# Patient Record
Sex: FEMALE | Race: WHITE | Employment: FULL TIME | URBAN - METROPOLITAN AREA
[De-identification: names, ages, dates, MRNs, and addresses within clinical notes are randomized per-mention and may not be internally consistent; named-entity substitution may affect disease eponyms.]

---

## 2018-05-08 LAB
ALBUMIN SERPL-MCNC: 4.9 G/DL
ALP BLD-CCNC: 51 U/L
ALT SERPL-CCNC: 21 U/L
ANION GAP SERPL CALCULATED.3IONS-SCNC: 14 MMOL/L
AST SERPL-CCNC: 21 U/L
AVERAGE GLUCOSE: 134
BILIRUB SERPL-MCNC: 0.3 MG/DL (ref 0.1–1.4)
BUN BLDV-MCNC: 25 MG/DL
CALCIUM SERPL-MCNC: 9.9 MG/DL
CHLORIDE BLD-SCNC: 101 MMOL/L
CHOLESTEROL, TOTAL: 180 MG/DL
CHOLESTEROL/HDL RATIO: 4.1
CO2: 26 MMOL/L
CREAT SERPL-MCNC: 0.9 MG/DL
CREATININE, URINE: NORMAL
GFR CALCULATED: NORMAL
GLUCOSE BLD-MCNC: 107 MG/DL
HBA1C MFR BLD: 6.3 %
HDLC SERPL-MCNC: 44 MG/DL (ref 35–70)
LDL CHOLESTEROL CALCULATED: 99 MG/DL (ref 0–160)
MICROALBUMIN/CREAT 24H UR: NORMAL MG/G{CREAT}
MICROALBUMIN/CREAT UR-RTO: NORMAL
POTASSIUM SERPL-SCNC: 4.5 MMOL/L
SODIUM BLD-SCNC: 141 MMOL/L
TOTAL PROTEIN: 7.9
TRIGL SERPL-MCNC: 185 MG/DL
VLDLC SERPL CALC-MCNC: 37 MG/DL

## 2018-07-17 PROBLEM — I10 ESSENTIAL HYPERTENSION: Status: ACTIVE | Noted: 2018-07-17

## 2018-07-17 PROBLEM — E11.9 TYPE 2 DIABETES MELLITUS WITHOUT COMPLICATION, WITHOUT LONG-TERM CURRENT USE OF INSULIN (HCC): Status: ACTIVE | Noted: 2018-07-17

## 2018-07-25 ENCOUNTER — OFFICE VISIT (OUTPATIENT)
Dept: NON INVASIVE DIAGNOSTICS | Age: 58
End: 2018-07-25
Payer: COMMERCIAL

## 2018-07-25 VITALS
RESPIRATION RATE: 16 BRPM | HEART RATE: 79 BPM | WEIGHT: 150 LBS | SYSTOLIC BLOOD PRESSURE: 124 MMHG | DIASTOLIC BLOOD PRESSURE: 70 MMHG | BODY MASS INDEX: 23.54 KG/M2 | HEIGHT: 67 IN

## 2018-07-25 DIAGNOSIS — I10 ESSENTIAL HYPERTENSION: ICD-10-CM

## 2018-07-25 DIAGNOSIS — R00.2 HEART PALPITATIONS: Primary | ICD-10-CM

## 2018-07-25 DIAGNOSIS — E11.9 TYPE 2 DIABETES MELLITUS WITHOUT COMPLICATION, WITHOUT LONG-TERM CURRENT USE OF INSULIN (HCC): ICD-10-CM

## 2018-07-25 PROCEDURE — 99212 OFFICE O/P EST SF 10 MIN: CPT | Performed by: INTERNAL MEDICINE

## 2018-07-25 PROCEDURE — 93000 ELECTROCARDIOGRAM COMPLETE: CPT | Performed by: INTERNAL MEDICINE

## 2018-07-25 RX ORDER — CHOLECALCIFEROL (VITAMIN D3) 25 MCG
CAPSULE ORAL DAILY
COMMUNITY
End: 2019-05-06

## 2018-09-26 ENCOUNTER — HOSPITAL ENCOUNTER (OUTPATIENT)
Age: 58
Discharge: HOME | End: 2018-09-28

## 2018-09-26 PROCEDURE — 86765 RUBEOLA ANTIBODY: CPT

## 2018-09-26 PROCEDURE — 86787 VARICELLA-ZOSTER ANTIBODY: CPT

## 2018-09-26 PROCEDURE — 86762 RUBELLA ANTIBODY: CPT

## 2018-09-26 PROCEDURE — 86735 MUMPS ANTIBODY: CPT

## 2018-09-27 LAB
MEASLES IMMUNE (IGG): NORMAL
MUMPS AB IGG: NORMAL
RUBELLA ANTIBODY IGG: NORMAL
VARICELLA-ZOSTER VIRUS AB, IGG: NORMAL

## 2018-11-20 ENCOUNTER — CLINICAL DOCUMENTATION (OUTPATIENT)
Dept: PHARMACY | Facility: CLINIC | Age: 58
End: 2018-11-20

## 2018-11-20 NOTE — PROGRESS NOTES
Pharmacy Pop Care Documentation:   Patient missing: Provider Documentation of DM Visit; A1C; Lipid Panel; Urine Albumin. Application Received: 24/63/27  Application scanned. E-Mail and Letter sent to patient.     Jonathon Vergara, [address]   Department, toll free: [phone], option 7

## 2018-11-29 ENCOUNTER — HOSPITAL ENCOUNTER (OUTPATIENT)
Dept: GENERAL RADIOLOGY | Age: 58
Discharge: HOME | End: 2018-12-01
Payer: COMMERCIAL

## 2018-11-29 DIAGNOSIS — Z12.31 SCREENING MAMMOGRAM, ENCOUNTER FOR: ICD-10-CM

## 2018-11-29 PROCEDURE — 77067 SCR MAMMO BI INCL CAD: CPT

## 2018-12-19 ENCOUNTER — TELEPHONE (OUTPATIENT)
Dept: PHARMACY | Facility: CLINIC | Age: 58
End: 2018-12-19

## 2018-12-27 ENCOUNTER — TELEPHONE (OUTPATIENT)
Dept: PHARMACY | Facility: CLINIC | Age: 58
End: 2018-12-27

## 2018-12-27 NOTE — LETTER
PRESCRIPTION REQUEST  DIABETES PROGRAM  Prescriber:  Minh Jamil, [address] / [place] [address]  [phone] (phone)  [phone] (fax)     Patient:  Nicky Washington  1960  [address] [place] [phone] (home)      Rationale:   Tiffanie Martinez is the preferred blood glucose monitor. Meter, strips and lancets will be covered at $0 copay through the [facility] [address]       Rx: [facility] Blood Glucose Monitor   #: 1  Refills: 0  Rx: [facility] Blood Glucose Test Strips   #: 200  Refills: 3  Rx: [facility] Lancets     #: 200  Refills: 3    Directions: Use 2 time(s) daily or as directed to test blood glucose         *NOTE to pharmacy: please do NOT fill until patient requests      Prescriber Response:    Prescription(s) approved (please [place] one):  YES  NO    Other response: ________________________________________      ______________________________________   __________________  Authorized By       Date     Pharmacy: [facility] Phone:  [phone]    [facility] [place], [address] [facility]   Fax:  [phone]    [place], [address] [facility]     The information transmitted is intended only for the person or entity to which it is addressed and may contain confidential and/or privileged material. Any review, retransmission, dissemination or other use of, or taking of any action in reliance upon, this information by persons or entities other than the intended recipient is prohibited. This document contains information covered under the Privacy Act, 5 (a), and/or the [employer] and Accountability Act ([address]) and its various implementing regulations and must be protected in accordance with those provisions. If you received this in error, please contact the sender and delete the material from any computer.

## 2018-12-27 NOTE — LETTER
Hannah Wilder, [address] / [place] [address]  [phone] (phone)  [phone] (fax)    Patient: Gage Magdaleno  : 1960  Address: [place] [address] [address]  Phone: [phone] (home)     Your patient is currently enrolled in [facility]. The goal of this voluntary program is to help [facility]) employees and covered dependents reach their health maintenance goals in regards to their diabetes diagnosis. After your patient's recent visit with a [facility], the below were identified as opportunities to assist with their diabetes management. - Consider increasing pravastatin to 40mg daily (increasing from pravastatin 20mg daily to moderate-intensity statin, per 2018 ACC/AHA Cholesterol guidelines and 2019 ADA Standards of Care) - rx template below for your convenience, if you agree    Please contact our team with any questions.    Thank you,  [facility] Team  Telephone [phone] Option #7   Fax [phone]   ===================================================================    Covered Medication(s) for Patient[de-identified]    Medication: pravastatin 40mg          Si tab po daily            #: 90            R: 3     (to replace pravastatin 20mg daily)     Prescriber Response:    Prescription(s) approved (please [place] one):  YES  NO    Other response: ________________________________________      ______________________________________   __________________  Authorized By       Date     Pharmacy:  [facility] Phone:  [phone]    [address], [address]   Fax:  [phone]    [facility], [address] [facility]     The information transmitted is intended only for the person or entity to which it is addressed and may contain confidential and/or privileged material. Any review, retransmission, dissemination or other use of, or

## 2018-12-27 NOTE — TELEPHONE ENCOUNTER
Value Date    HDL 44 05/08/2018     Lab Results   Component Value Date    LDLCALC 99 05/08/2018     ALT   Date Value Ref Range Status   05/08/2018 21 U/L Final     The 10-year ASCVD risk score (Verna Ledesma et al., 2013) is: 5.3%    Values used to calculate the score:      Age: 66 years      Sex: Female      Is Non- : No      Diabetic: Yes      Tobacco smoker: No      Systolic Blood Pressure: 565 mmHg      Is BP treated: No      HDL Cholesterol: 44 mg/dL      Total Cholesterol: 180 mg/dL     CrCl cannot be calculated (Patient's most recent lab result is older than the maximum 120 days allowed. ). Immunizations: There is no immunization history on file for this patient. Smoking Status:  History   Smoking Status    Never Smoker   Smokeless Tobacco    Never Used      ASSESSMENT:  Initial Program Requirements (to be completed by 7/1/2019):  [] OV with provider for DM (1st) - reviewed will need documentation sent with at least 2 OVs since provider outside of [facility] charting system  [] A1c (1st)  [x] On statin - pravastatin 20mg daily  [x] On ACEi/ARB or contraindication(s) Normal blood pressure, urinary albumin-to-creatinine ratio, and eGFR     Ongoing Program Requirements (to be completed by 12/15/2019):  [] OV with provider for DM (2nd)  [] ACC/diabetes educator visit (if A1c over 8%)  [] A1c (2nd)  [] Lipid panel  [] Urine protein  [] Pneumococcal vaccination: up to date  [] Influenza vaccination for 2019 - 2020 - is 28msec  [] Medication adherence over 70% - discussed; unable to assess per claims hx at this time d/t new employee    Formulary Medication Review:  Non-formulary or medications with cost-effective alternatives: [employer] BG meter/supplies - agreeable to Marion Bryant conversion.      Current medications eligible for copay waiver, up to $600, through [facility] ([facility]) (mail order) Pharmacy:  - metformin, pravastatin  - Generic ([facility] pharmacy-stocked) insulin syringes and pen

## 2019-01-03 ENCOUNTER — CLINICAL DOCUMENTATION (OUTPATIENT)
Dept: PHARMACY | Facility: CLINIC | Age: 59
End: 2019-01-03

## 2019-03-25 LAB
AVERAGE GLUCOSE: NORMAL
HBA1C MFR BLD: 6.4 %

## 2019-04-04 ENCOUNTER — PATIENT MESSAGE (OUTPATIENT)
Dept: PHARMACY | Facility: CLINIC | Age: 59
End: 2019-04-04

## 2019-05-15 ENCOUNTER — HOSPITAL ENCOUNTER (OUTPATIENT)
Age: 59
Discharge: HOME | End: 2019-05-17
Payer: COMMERCIAL

## 2019-05-15 DIAGNOSIS — N76.1 CHRONIC VAGINITIS: ICD-10-CM

## 2019-05-15 PROCEDURE — 87801 DETECT AGNT MULT DNA AMPLI: CPT

## 2019-05-15 PROCEDURE — 87798 DETECT AGENT NOS DNA AMP: CPT

## 2019-05-26 LAB
Lab: NORMAL
REPORT: NORMAL
THIS TEST SENT TO: NORMAL

## 2019-06-06 ENCOUNTER — CLINICAL DOCUMENTATION (OUTPATIENT)
Dept: PHARMACY | Facility: CLINIC | Age: 59
End: 2019-06-06

## 2019-10-10 ENCOUNTER — PATIENT MESSAGE (OUTPATIENT)
Dept: PHARMACY | Facility: CLINIC | Age: 59
End: 2019-10-10

## 2019-11-04 ENCOUNTER — NURSE TRIAGE (OUTPATIENT)
Dept: OTHER | Facility: CLINIC | Age: 59
End: 2019-11-04

## 2019-11-04 LAB
ALBUMIN SERPL-MCNC: 4.7 G/DL
ALP BLD-CCNC: 51 U/L
ALT SERPL-CCNC: 21 U/L
ANION GAP SERPL CALCULATED.3IONS-SCNC: 11 MMOL/L
AST SERPL-CCNC: 21 U/L
AVERAGE GLUCOSE: 148
BILIRUB SERPL-MCNC: 0.6 MG/DL (ref 0.1–1.4)
BILIRUBIN, POC: NORMAL
BLOOD URINE, POC: NORMAL
BUN BLDV-MCNC: 15 MG/DL
CALCIUM SERPL-MCNC: 9.8 MG/DL
CHLORIDE BLD-SCNC: 105 MMOL/L
CHOLESTEROL, TOTAL: 208 MG/DL
CHOLESTEROL/HDL RATIO: 3.6
CLARITY, POC: NORMAL
CO2: 25 MMOL/L
COLOR, POC: NORMAL
CREAT SERPL-MCNC: 0.87 MG/DL
GFR CALCULATED: NORMAL
GLUCOSE BLD-MCNC: 115 MG/DL
GLUCOSE URINE, POC: NORMAL
HBA1C MFR BLD: 6.8 %
HDLC SERPL-MCNC: 57 MG/DL (ref 35–70)
KETONES, POC: NORMAL
LDL CHOLESTEROL CALCULATED: 132 MG/DL (ref 0–160)
LEUKOCYTE EST, POC: NORMAL
NITRITE, POC: NORMAL
PH, POC: 6
POTASSIUM SERPL-SCNC: 4.3 MMOL/L
PROTEIN, POC: NORMAL
SODIUM BLD-SCNC: 141 MMOL/L
SPECIFIC GRAVITY, POC: 1.01
TOTAL PROTEIN: 7.5
TRIGL SERPL-MCNC: 95 MG/DL
UROBILINOGEN, POC: NORMAL
VLDLC SERPL CALC-MCNC: 19 MG/DL

## 2019-11-08 RX ORDER — OCTISALATE, AVOBENZONE, HOMOSALATE, AND OCTOCRYLENE 29.4; 29.4; 49; 25.48 MG/ML; MG/ML; MG/ML; MG/ML
1 LOTION TOPICAL DAILY
COMMUNITY

## 2019-11-08 RX ORDER — BIOTIN 10000 MCG
CAPSULE ORAL
COMMUNITY
End: 2019-12-13 | Stop reason: ALTCHOICE

## 2019-11-08 RX ORDER — CLONAZEPAM 1 MG/1
1 TABLET ORAL NIGHTLY PRN
COMMUNITY

## 2019-11-11 ENCOUNTER — CLINICAL DOCUMENTATION (OUTPATIENT)
Dept: PHARMACY | Facility: CLINIC | Age: 59
End: 2019-11-11

## 2019-11-12 ENCOUNTER — PREP FOR PROCEDURE (OUTPATIENT)
Dept: GASTROENTEROLOGY | Age: 59
End: 2019-11-12

## 2019-11-12 RX ORDER — SODIUM CHLORIDE 9 MG/ML
INJECTION, SOLUTION INTRAVENOUS CONTINUOUS
Status: CANCELLED | OUTPATIENT
Start: 2019-11-12

## 2019-11-12 RX ORDER — SODIUM CHLORIDE 0.9 % (FLUSH) 0.9 %
10 SYRINGE (ML) INJECTION PRN
Status: CANCELLED | OUTPATIENT
Start: 2019-11-12

## 2019-11-12 RX ORDER — SODIUM CHLORIDE 0.9 % (FLUSH) 0.9 %
10 SYRINGE (ML) INJECTION EVERY 12 HOURS SCHEDULED
Status: CANCELLED | OUTPATIENT
Start: 2019-11-12

## 2019-11-13 ENCOUNTER — ANESTHESIA EVENT (OUTPATIENT)
Dept: ENDOSCOPY | Age: 59
End: 2019-11-13
Payer: COMMERCIAL

## 2019-11-13 ENCOUNTER — HOSPITAL ENCOUNTER (OUTPATIENT)
Age: 59
Setting detail: OUTPATIENT SURGERY
Discharge: HOME | End: 2019-11-13
Attending: INTERNAL MEDICINE | Admitting: INTERNAL MEDICINE
Payer: COMMERCIAL

## 2019-11-13 ENCOUNTER — ANESTHESIA (OUTPATIENT)
Dept: ENDOSCOPY | Age: 59
End: 2019-11-13
Payer: COMMERCIAL

## 2019-11-13 VITALS
RESPIRATION RATE: 18 BRPM | BODY MASS INDEX: 23.54 KG/M2 | WEIGHT: 150 LBS | DIASTOLIC BLOOD PRESSURE: 75 MMHG | SYSTOLIC BLOOD PRESSURE: 131 MMHG | OXYGEN SATURATION: 98 % | HEIGHT: 67 IN | HEART RATE: 76 BPM | TEMPERATURE: 97.8 F

## 2019-11-13 VITALS
OXYGEN SATURATION: 98 % | SYSTOLIC BLOOD PRESSURE: 90 MMHG | DIASTOLIC BLOOD PRESSURE: 53 MMHG | RESPIRATION RATE: 12 BRPM

## 2019-11-13 LAB — METER GLUCOSE: 113 MG/DL (ref 74–99)

## 2019-11-13 PROCEDURE — 7100000010 HC PHASE II RECOVERY - FIRST 15 MIN: Performed by: INTERNAL MEDICINE

## 2019-11-13 PROCEDURE — 6360000002 HC RX W HCPCS: Performed by: NURSE ANESTHETIST, CERTIFIED REGISTERED

## 2019-11-13 PROCEDURE — 82962 GLUCOSE BLOOD TEST: CPT

## 2019-11-13 PROCEDURE — 3700000001 HC ADD 15 MINUTES (ANESTHESIA): Performed by: INTERNAL MEDICINE

## 2019-11-13 PROCEDURE — 3700000000 HC ANESTHESIA ATTENDED CARE: Performed by: INTERNAL MEDICINE

## 2019-11-13 PROCEDURE — 3609027000 HC COLONOSCOPY: Performed by: INTERNAL MEDICINE

## 2019-11-13 PROCEDURE — 7100000011 HC PHASE II RECOVERY - ADDTL 15 MIN: Performed by: INTERNAL MEDICINE

## 2019-11-13 PROCEDURE — 2580000003 HC RX 258: Performed by: INTERNAL MEDICINE

## 2019-11-13 PROCEDURE — 2709999900 HC NON-CHARGEABLE SUPPLY: Performed by: INTERNAL MEDICINE

## 2019-11-13 RX ORDER — PROPOFOL 10 MG/ML
INJECTION, EMULSION INTRAVENOUS PRN
Status: DISCONTINUED | OUTPATIENT
Start: 2019-11-13 | End: 2019-11-13 | Stop reason: SDUPTHER

## 2019-11-13 RX ORDER — SODIUM CHLORIDE 9 MG/ML
INJECTION, SOLUTION INTRAVENOUS CONTINUOUS
Status: DISCONTINUED | OUTPATIENT
Start: 2019-11-13 | End: 2019-11-13 | Stop reason: HOSPADM

## 2019-11-13 RX ORDER — SODIUM CHLORIDE 0.9 % (FLUSH) 0.9 %
10 SYRINGE (ML) INJECTION PRN
Status: DISCONTINUED | OUTPATIENT
Start: 2019-11-13 | End: 2019-11-13 | Stop reason: HOSPADM

## 2019-11-13 RX ORDER — SODIUM CHLORIDE 0.9 % (FLUSH) 0.9 %
10 SYRINGE (ML) INJECTION EVERY 12 HOURS SCHEDULED
Status: DISCONTINUED | OUTPATIENT
Start: 2019-11-13 | End: 2019-11-13 | Stop reason: HOSPADM

## 2019-11-13 RX ORDER — FENTANYL CITRATE 50 UG/ML
INJECTION, SOLUTION INTRAMUSCULAR; INTRAVENOUS PRN
Status: DISCONTINUED | OUTPATIENT
Start: 2019-11-13 | End: 2019-11-13 | Stop reason: SDUPTHER

## 2019-11-13 RX ADMIN — SODIUM CHLORIDE: 9 INJECTION, SOLUTION INTRAVENOUS at 14:29

## 2019-11-13 RX ADMIN — FENTANYL CITRATE 50 MCG: 50 INJECTION, SOLUTION INTRAMUSCULAR; INTRAVENOUS at 14:29

## 2019-11-13 RX ADMIN — PROPOFOL 250 MG: 10 INJECTION, EMULSION INTRAVENOUS at 14:29

## 2019-11-13 ASSESSMENT — PAIN SCALES - GENERAL
PAINLEVEL_OUTOF10: 0

## 2019-11-13 ASSESSMENT — ENCOUNTER SYMPTOMS: SHORTNESS OF BREATH: 0

## 2019-12-09 ENCOUNTER — TELEPHONE (OUTPATIENT)
Dept: PHARMACY | Facility: CLINIC | Age: 59
End: 2019-12-09

## 2019-12-13 ENCOUNTER — SCHEDULED TELEPHONE ENCOUNTER (OUTPATIENT)
Dept: PHARMACY | Facility: CLINIC | Age: 59
End: 2019-12-13

## 2020-04-15 ENCOUNTER — HOSPITAL ENCOUNTER (OUTPATIENT)
Age: 60
Discharge: HOME | End: 2020-04-17
Payer: COMMERCIAL

## 2020-04-15 PROCEDURE — 88175 CYTOPATH C/V AUTO FLUID REDO: CPT

## 2020-04-15 PROCEDURE — 87624 HPV HI-RISK TYP POOLED RSLT: CPT

## 2020-04-21 LAB
HPV SAMPLE: NORMAL
HPV TYPE 16: NOT DETECTED
HPV TYPE 18: NOT DETECTED
HPV, HIGH RISK OTHER: NOT DETECTED
INTERPRETATION: NORMAL
SOURCE: NORMAL

## 2020-05-13 LAB
AVERAGE GLUCOSE: 174
CHOLESTEROL, TOTAL: 247 MG/DL
CHOLESTEROL/HDL RATIO: 4
HBA1C MFR BLD: 7.7 %
HDLC SERPL-MCNC: 61 MG/DL (ref 35–70)
LDL CHOLESTEROL CALCULATED: 147 MG/DL (ref 0–160)
TRIGL SERPL-MCNC: 196 MG/DL
VLDLC SERPL CALC-MCNC: 39 MG/DL

## 2020-05-15 ENCOUNTER — HOSPITAL ENCOUNTER (OUTPATIENT)
Dept: GENERAL RADIOLOGY | Age: 60
Discharge: HOME | End: 2020-05-17
Payer: COMMERCIAL

## 2020-05-15 PROCEDURE — 77063 BREAST TOMOSYNTHESIS BI: CPT

## 2020-06-01 ENCOUNTER — CLINICAL DOCUMENTATION (OUTPATIENT)
Dept: PHARMACY | Facility: CLINIC | Age: 60
End: 2020-06-01

## 2020-07-07 ENCOUNTER — TELEPHONE (OUTPATIENT)
Dept: ADMINISTRATIVE | Age: 60
End: 2020-07-07

## 2020-07-07 ENCOUNTER — NURSE TRIAGE (OUTPATIENT)
Dept: OTHER | Facility: CLINIC | Age: 60
End: 2020-07-07

## 2020-07-07 ENCOUNTER — HOSPITAL ENCOUNTER (OUTPATIENT)
Age: 60
Discharge: HOME | End: 2020-07-09
Payer: COMMERCIAL

## 2020-07-07 ENCOUNTER — OFFICE VISIT (OUTPATIENT)
Dept: PRIMARY CARE CLINIC | Age: 60
End: 2020-07-07
Payer: COMMERCIAL

## 2020-07-07 VITALS
TEMPERATURE: 98.6 F | HEART RATE: 84 BPM | DIASTOLIC BLOOD PRESSURE: 79 MMHG | OXYGEN SATURATION: 98 % | HEIGHT: 67 IN | BODY MASS INDEX: 25.74 KG/M2 | WEIGHT: 164 LBS | SYSTOLIC BLOOD PRESSURE: 124 MMHG

## 2020-07-07 PROCEDURE — U0003 INFECTIOUS AGENT DETECTION BY NUCLEIC ACID (DNA OR RNA); SEVERE ACUTE RESPIRATORY SYNDROME CORONAVIRUS 2 (SARS-COV-2) (CORONAVIRUS DISEASE [COVID-19]), AMPLIFIED PROBE TECHNIQUE, MAKING USE OF HIGH THROUGHPUT TECHNOLOGIES AS DESCRIBED BY CMS-2020-01-R: HCPCS

## 2020-07-07 PROCEDURE — 99213 OFFICE O/P EST LOW 20 MIN: CPT | Performed by: FAMILY MEDICINE

## 2020-07-07 RX ORDER — METHYLPREDNISOLONE 4 MG/1
TABLET ORAL
Qty: 1 KIT | Refills: 0 | Status: SHIPPED | OUTPATIENT
Start: 2020-07-07 | End: 2020-07-13

## 2020-07-07 RX ORDER — AMOXICILLIN AND CLAVULANATE POTASSIUM 875; 125 MG/1; MG/1
1 TABLET, FILM COATED ORAL 2 TIMES DAILY WITH MEALS
Qty: 20 TABLET | Refills: 0 | Status: SHIPPED | OUTPATIENT
Start: 2020-07-07 | End: 2020-07-17

## 2020-07-07 NOTE — LETTER
[address] [place] [address]  [place], [address]  Phone: [phone]  Fax: [phone]    Raymond Goldberg, MD      7/7/2020     Patient: Amber Henry   YOB: 1960       To Whom It May Concern: It is my medical opinion that Amber Henry should remain out of work while acutely ill and awaiting COVID-19 test results. Return to work with no retesting should be followed if test is negative AND meets these 3 criteria as outlined by CDC/ODH:   a. No fever without the use of fever reducers for 3 days  b. Improvement in symptoms  c. At least 7 days since the onset of symptoms     If tests positive for COVID-19, needs 10-14 days self-quarantine followed by retesting to return to work. If you have any questions or concerns, please don't hesitate to call.     Sincerely,        Raymond Goldberg, MD, [facility] [phone]

## 2020-07-07 NOTE — PROGRESS NOTES
rhonchi or rales. Lymphadenopathy:      Cervical: No cervical adenopathy. Skin:     General: Skin is warm and dry. Assessment/Plan:  Yanet Lamas was seen today for cough, headache, congestion, generalized body aches, fatigue, nose problem and pharyngitis. Diagnoses and all orders for this visit:    Suspected COVID-19 virus infection  -     Cancel: COVID-19; Future    Other orders  -     amoxicillin-clavulanate (AUGMENTIN) 875-125 MG per tablet; Take 1 tablet by mouth 2 times daily (with meals) for 10 days  -     methylPREDNISolone (MEDROL, JACQUELINE,) 4 MG tablet; Take as directed on insert    Tylenol PRN  COVID test sent today  Can add Vit C and low dose Zn OTC for 3-5 days  Further meds pending results and symptom progression  Advised RTW criteria    Advised of current Covid-19 pandemic and it is possible that this could be a covid infection and as such certain precautions/isolation should be followed. Gave CDC info on both flu and Covid-19 precautions including remaining at home while sick and avoiding people who could possibly develop severe infection if exposed. Ayan Lopes MD  7/7/20     This visit was provided as a focused evaluation during the COVID -19 pandemic/national emergency. A comprehensive review of all previous patient history and testing was not conducted. Pertinent findings were elicited during the visit.

## 2020-07-07 NOTE — TELEPHONE ENCOUNTER
Patient is a [employer] employee. She has not had direct contact with anyone with the covid. She has a runny nose, phlem, coughing, headache, fatigued but no SOB, or fever. She does not need a return to work excuse -- so per our covid workflow I transferred patient to a nurse Niyah and not to the HR line.

## 2020-07-10 LAB — SARS-COV-2: NOT DETECTED

## 2020-07-26 NOTE — PROGRESS NOTES
Electrophysiology Outpatient Progress Note    Elizabeth Branch  1960  Date of Service: 7/25/2018  Referring Provider/PCP: Lexie Mccarty MD  Primary Electrophysiologist: Juan Carlos Marx MD, [facility]    Chief Complaint: SVT     SUBJECTIVE: Elizabeth Branch presents to the office today for a one year follow -up. Ms. Elias Kan states she feels overall well. She stopped her BB and increased her exercise routine - she reports no SVT episodes in the past 2 years. She presents today in sinus rhythm and denies any chest pain, SOB, dizziness, lightheadedness, syncope or near syncope. Patient Active Problem List    Diagnosis Date Noted    Essential hypertension 07/17/2018    Type 2 diabetes mellitus without complication, without long-term current use of insulin ([facility] [[email or web address]]ca 75.) 07/17/2018    Heart palpitations 06/04/2016    Hypothyroidism 05/27/2016    Hyperlipidemia 05/27/2016    Left breast mass 05/13/2013       Current Outpatient Prescriptions   Medication Sig Dispense Refill    Cholecalciferol (VITAMIN D-3) 1000 units CAPS Take by mouth daily      Omega-3 Fatty Acids (FISH OIL PO) Take by mouth daily Marine lipids      ALPHA-LIPOIC ACID PO Take by mouth daily      BIOTIN PO Take by mouth daily      Flaxseed, Linseed, (FLAX SEED OIL PO) Take by mouth daily      Multiple Vitamins-Minerals (MULTIVITAMIN ADULTS) TABS Take by mouth daily      levothyroxine (SYNTHROID) 50 MCG tablet Take 50 mcg by mouth daily   6    omeprazole (PRILOSEC) 20 MG capsule Take 20 mg by mouth Daily   6    metFORMIN (GLUCOPHAGE) 500 MG tablet Take 500 mg by mouth 2 times daily (with meals)   5    pravastatin (PRAVACHOL) 20 MG tablet Take 20 mg by mouth daily. No current facility-administered medications for this visit. Allergies   Allergen Reactions    Other      Raw apples     Review of Systems:   Constitutional: Negative for fever, activity change and appetite change.    Respiratory: Negative for cough, chest tightness, shortness of breath and wheezing. Cardiovascular: negative except as outlined in the HPI    PHYSICAL EXAM:  Vitals:    18 0830   BP: 124/70   Pulse: 79   Resp: 16   Weight: 150 lb (68 kg)   Height: 5' 7\" (1.702 m)   Constitutional: Oriented to person, place, and time. Well-developed and cooperative. Head: Normocephalic and atraumatic. Cardiovascular:  Normal S1/ S2, Feet appear to be well perfused. Regular rhythm present. PMI is not displaced. Pulmonary/Chest: Effort normal and breath sounds normal. No respiratory distress. Musculoskeletal: Normal range of motion of all extremities, no muscle weakness. Neurological: Alert and oriented to person, place, and time. Gait normal.   Skin: Skin is warm and dry. No bruising, no ecchymosis and no rash noted. Extremity: No clubbing or cyanosis. No edema  Psychiatric: Normal mood and affect. Thought content normal.       EK18 NSR,  rate 73 bpm, normal intervals   - see scanned cardiology    Complete TTE 2016:  Findings      Left Ventricle   Left ventricle size is normal. Normal left ventricular wall thickness.   Ejection fraction is visually estimated at 60%. No regional wall motion   abnormalities seen. Normal left ventricular diastolic filling pattern for   age.      Right Ventricle   Normal right ventricle structure and function.      Left Atrium   Left atrium is of normal size.      Right Atrium   Normal right atrium.      Mitral Valve   Structurally normal mitral valve. No evidence of mitral valve stenosis.   Mild mitral regurgitation is present.      Tricuspid Valve   The tricuspid valve appears structurally normal.   Physiologic and/or trace tricuspid regurgitation.      Aortic Valve   The aortic valve is trileaflet. No hemodynamically significant aortic   stenosis is present.  No evidence of aortic valve regurgitation.      Pulmonic Valve   Normal pulmonic valve structure and function.      Pericardial Effusion   No evidence for hemodynamically significant 26-Jul-2020 20:40

## 2020-08-17 LAB
AVERAGE GLUCOSE: 137
CHOLESTEROL, TOTAL: 180 MG/DL
CHOLESTEROL/HDL RATIO: 3
CREATININE, URINE: 109.08
HBA1C MFR BLD: 6.4 %
HDLC SERPL-MCNC: 60 MG/DL (ref 35–70)
LDL CHOLESTEROL CALCULATED: 108 MG/DL (ref 0–160)
MICROALBUMIN/CREAT 24H UR: <0.7 MG/G{CREAT}
MICROALBUMIN/CREAT UR-RTO: <1.8
NONHDLC SERPL-MCNC: NORMAL MG/DL
TRIGL SERPL-MCNC: 62 MG/DL
VLDLC SERPL CALC-MCNC: 12 MG/DL

## 2020-09-24 ENCOUNTER — CLINICAL DOCUMENTATION (OUTPATIENT)
Dept: PHARMACY | Facility: CLINIC | Age: 60
End: 2020-09-24

## 2020-09-24 NOTE — PROGRESS NOTES
Pharmacy Pop Care Documentation:     AVS received for required office visit on: 8/17/20: Dr. Paty Armas

## 2021-01-07 ENCOUNTER — TELEPHONE (OUTPATIENT)
Dept: PHARMACY | Facility: CLINIC | Age: 61
End: 2021-01-07

## 2021-01-07 NOTE — TELEPHONE ENCOUNTER
Called patient to schedule pharmacist appointment to discuss medications for Diabetes Management Program.    No answer. Left VM on home/cell TAD: Please call back at [phone] Option #7 to retrieve the above message.

## 2021-01-08 ENCOUNTER — TELEPHONE (OUTPATIENT)
Dept: PHARMACY | Facility: CLINIC | Age: 61
End: 2021-01-08

## 2021-01-08 NOTE — TELEPHONE ENCOUNTER
Called patient to schedule pharmacist appointment to discuss medications for Diabetes Management Program.    No answer. Left VM on home/cell TAD: Please call back at [phone] Option #7 to retrieve the above message.

## 2021-01-13 NOTE — TELEPHONE ENCOUNTER
Called patient to schedule yearly pharmacist appointment to discuss medications for Diabetes Management Program.     Spoke to patient and appointment scheduled for 1/20/21 at [address], [address]   Department, toll free: [phone], option 7

## 2021-01-20 ENCOUNTER — SCHEDULED TELEPHONE ENCOUNTER (OUTPATIENT)
Dept: PHARMACY | Facility: CLINIC | Age: 61
End: 2021-01-20

## 2021-01-20 RX ORDER — LIFITEGRAST 50 MG/ML
1 SOLUTION/ DROPS OPHTHALMIC 2 TIMES DAILY
COMMUNITY

## 2021-01-20 RX ORDER — MINOXIDIL 2 %
SOLUTION, NON-ORAL TOPICAL NIGHTLY
COMMUNITY

## 2021-01-20 RX ORDER — CLOBETASOL PROPIONATE 0.05 G/ML
SPRAY TOPICAL NIGHTLY
COMMUNITY

## 2021-01-20 NOTE — TELEPHONE ENCOUNTER
POPULATION HEALTH CLINICAL PHARMACY REVIEW - BE WELL WITH DIABETES  =================================================================  Jacquie Skinner is a 64 y.o. female enrolled in the [facility] [place] Employee Diabetes Program. Patient provided Katharinevincent Mariza with verbal consent to remain in the program for this year. Medications:  Medication Sig    Lifitegrast (XIIDRA) 5 % SOLN Apply 1 drop to eye 2 times daily    tretinoin (RETIN-A) 0.025 % cream Apply topically nightly Apply topically to the face nightly.  Clobetasol Propionate 0.05 % LIQD Apply topically nightly Apply to hair at bedtime.  minoxidil (ROGAINE) 2 % external solution Apply topically nightly Apply topically at bedtime.  Omega-3 Fat Ac-Cholecalciferol (DRY EYE OMEGA BENEFITS/VIT D-3 PO) Take 1 capsule by mouth 2 times daily    Biotin w/ Vitamins C & E (HAIR/SKIN/NAILS PO) Take 1 tablet by mouth daily    Probiotic Product (ALIGN) 4 MG CAPS Take 1 capsule by mouth daily     clonazePAM (KLONOPIN) 1 MG tablet Take 1 mg by mouth nightly as needed (sleep).  Multiple Vitamins-Minerals (MULTIVITAMIN ADULTS) TABS Take 1 tablet by mouth daily     levothyroxine (SYNTHROID) 50 MCG tablet  · Covered by DM program (newly added in 2021) Take 50 mcg by mouth daily     omeprazole (PRILOSEC) 20 MG capsule Take 20 mg by mouth nightly     metFORMIN (GLUCOPHAGE) 500 MG tablet  · Covered by DM program Take 500 mg by mouth 2 times daily (with meals)     pravastatin (PRAVACHOL) 40 MG tablet  · Covered by DM program Take 40 mg by mouth daily       Current Pharmacy: [facility]  Current testing supplies/frequency: [facility] - tests BID. Allergies:   Allergies   Allergen Reactions    Other      Raw apples- rash/ itchy in throat       Vitals/Labs:  BP Readings from Last 3 Encounters:   07/07/20 124/79   04/15/20 132/80   11/13/19 131/75     No results found for: Velasquez Mcguire     Lab Results   Component Value Date LABA1C 6.4 08/17/2020    LABA1C 7.7 05/13/2020    LABA1C 6.8 11/04/2019     Lab Results   Component Value Date    CHOL 180 08/17/2020    TRIG 62 08/17/2020    HDL 60 08/17/2020    LDLCALC 108 08/17/2020     ALT   Date Value Ref Range Status   11/04/2019 21 U/L Final     Comment:     Per scanned documentation     AST   Date Value Ref Range Status   11/04/2019 21 U/L Final     Comment:     Per scanned documentation     The 10-year ASCVD risk score (Niurka De Los Santos et al., 2013) is: 5.5%    Values used to calculate the score:      Age: 64 years      Sex: Female      Is Non- : No      Diabetic: Yes      Tobacco smoker: No      Systolic Blood Pressure: 123 mmHg      Is BP treated: Yes      HDL Cholesterol: 60 mg/dL      Total Cholesterol: 180 mg/dL     Lab Results   Component Value Date    CREATININE 0.87 11/04/2019     CrCl cannot be calculated (Patient's most recent lab result is older than the maximum 120 days allowed. ). Immunizations:  Immunization History   Administered Date(s) Administered    Influenza Virus Vaccine 10/18/2019    Pneumococcal Polysaccharide ([id number]) 03/25/2019      Social History:  Social History     Tobacco Use    Smoking status: Never Smoker    Smokeless tobacco: Never Used   Substance Use Topics    Alcohol use: Yes     Comment: 2 drinks twice a month     ASSESSMENT:  Initial Program Requirements (Y indicates has completed for the year, N indicates needs to be completed by 7/1/2021): No - Office Visit with provider for DM (1st)  No - A1c (1st)     Ongoing Program Requirements (Y indicates has completed for the year, N indicates needs to be completed by 12/31/2021):   No - Office Visit with provider for DM (2nd)  No - ACC/diabetes educator visit (if A1c over 8%) - likely will not need, A1c stable < 7%  No - A1c (2nd)  No - Lipid panel  No - Urine microalbumin  Yes - Pneumococcal vaccination: Up-to-date, not needed again until age 74  No - Influenza vaccination for Vinh Tammy   4/21/2021  2:00 PM MD ATUL Casey BNalluri SONYA Garza, PharmD, [facility]  Direct: [phone]  Department, toll free [phone], option 7          For Pharmacy Admin Tracking Only    PHSO: Yes  Total # of Interventions Recommended: 1  - Updated Order #: 1 Updated Order Reason(s):  Other  Recommended intervention potential cost savings: 1  Total Interventions Accepted: 1  Time Spent (min): 45

## 2021-03-30 LAB
AVERAGE GLUCOSE: 157
CHOLESTEROL, TOTAL: 200 MG/DL
CHOLESTEROL/HDL RATIO: 3
CREATININE, URINE: 28.49
HBA1C MFR BLD: 7.1 %
HDLC SERPL-MCNC: 67 MG/DL (ref 35–70)
LDL CHOLESTEROL CALCULATED: 106 MG/DL (ref 0–160)
MICROALBUMIN/CREAT 24H UR: <0.7 MG/G{CREAT}
MICROALBUMIN/CREAT UR-RTO: NORMAL
NONHDLC SERPL-MCNC: NORMAL MG/DL
TRIGL SERPL-MCNC: 133 MG/DL
VLDLC SERPL CALC-MCNC: 27 MG/DL

## 2021-04-01 ENCOUNTER — CLINICAL DOCUMENTATION (OUTPATIENT)
Dept: PHARMACY | Facility: CLINIC | Age: 61
End: 2021-04-01

## 2021-04-01 NOTE — PROGRESS NOTES
Pharmacy Pop Care Documentation:     AVS received for required office visit on: 3/30/21: Dr. Monterroso Trish

## 2021-04-22 DIAGNOSIS — Z12.11 SCREEN FOR COLON CANCER: ICD-10-CM

## 2021-04-22 DIAGNOSIS — Z01.419 WOMEN'S ANNUAL ROUTINE GYNECOLOGICAL EXAMINATION: ICD-10-CM

## 2021-04-22 DIAGNOSIS — Z12.31 SCREENING MAMMOGRAM, ENCOUNTER FOR: ICD-10-CM

## 2021-10-21 LAB
AVERAGE GLUCOSE: NORMAL
HBA1C MFR BLD: 7.3 %

## 2021-11-10 ENCOUNTER — CLINICAL DOCUMENTATION (OUTPATIENT)
Dept: PHARMACY | Facility: CLINIC | Age: 61
End: 2021-11-10

## 2021-11-10 NOTE — PROGRESS NOTES
Pharmacy Pop Care Documentation:     AVS received for required office visit on: 10/19/21: Ramonita Hernández per Care Everywhere

## 2022-01-14 ENCOUNTER — TELEPHONE (OUTPATIENT)
Dept: PHARMACY | Facility: CLINIC | Age: 62
End: 2022-01-14

## 2022-01-14 NOTE — TELEPHONE ENCOUNTER
2022 Annual Pharmacist Visit    Called patient to schedule 2022 yearly pharmacist appointment to discuss medications for Diabetes Management Program.    No answer. Left VM.      Please call back at [phone], option #3         Kang Corona, [address]   Phone: [phone], option #3

## 2022-01-14 NOTE — TELEPHONE ENCOUNTER
Patient returned call regarding 2022 Annual Pharmacist Visit. Patient states she is no longer employed with [employer].

## 2022-01-14 NOTE — TELEPHONE ENCOUNTER
For [place] in place:  No   Recommendation Provided To: Patient/Caregiver: 1 via Telephone   Gap Closed?: No    Intervention Accepted By: Patient/Caregiver: 0   Time Spent (min): 5

## 2022-01-28 ENCOUNTER — CLINICAL DOCUMENTATION (OUTPATIENT)
Dept: PHARMACY | Facility: CLINIC | Age: 62
End: 2022-01-28

## 2022-01-28 NOTE — LETTER
[facility]  [address], [address]  Phone: toll free [phone], option 3  Fax: María 01 456 [address] [address]           01/28/22     Dear Agus Underwood,     We regret to inform you that you have been disqualified from Be Well With DM Program because the following: You are no longer a [address] will not receive the copay waiver up to $600 towards your diabetic medications and supplies in 2022. If you qualify once again, you will be eligible to reapply to the Be Well With DM Program the following calendar year. Thank you,    [facility] Team   [phone], option #3  Email: [email or web address]   Fax Number: [phone]

## 2022-01-28 NOTE — PROGRESS NOTES
Pharmacy Pop Care Documentation:     Lenard Trinh is being removed from the diabetes management program for the following reason(s): Loss of [facility]    Yrn Luciano

## (undated) DEVICE — GRADUATE TRIANG MEASURE 1000ML BLK PRNT